# Patient Record
Sex: MALE | Race: WHITE | NOT HISPANIC OR LATINO | Employment: FULL TIME | URBAN - METROPOLITAN AREA
[De-identification: names, ages, dates, MRNs, and addresses within clinical notes are randomized per-mention and may not be internally consistent; named-entity substitution may affect disease eponyms.]

---

## 2021-01-13 ENCOUNTER — HOSPITAL ENCOUNTER (EMERGENCY)
Facility: HOSPITAL | Age: 46
Discharge: HOME | End: 2021-01-13
Attending: INTERNAL MEDICINE | Admitting: INTERNAL MEDICINE
Payer: COMMERCIAL

## 2021-01-13 VITALS
HEART RATE: 70 BPM | BODY MASS INDEX: 18.9 KG/M2 | WEIGHT: 135 LBS | RESPIRATION RATE: 16 BRPM | HEIGHT: 71 IN | SYSTOLIC BLOOD PRESSURE: 164 MMHG | OXYGEN SATURATION: 99 % | DIASTOLIC BLOOD PRESSURE: 93 MMHG | TEMPERATURE: 98 F

## 2021-01-13 DIAGNOSIS — M77.10 LATERAL EPICONDYLITIS: Primary | ICD-10-CM

## 2021-01-13 PROCEDURE — 99283 EMERGENCY DEPT VISIT LOW MDM: CPT

## 2021-01-13 PROCEDURE — 99284 EMERGENCY DEPT VISIT MOD MDM: CPT | Performed by: PHYSICIAN ASSISTANT

## 2021-01-13 RX ORDER — NAPROXEN 500 MG/1
500 TABLET ORAL 2 TIMES DAILY WITH MEALS
Qty: 30 TABLET | Refills: 0 | Status: SHIPPED | OUTPATIENT
Start: 2021-01-13

## 2021-01-13 RX ORDER — NAPROXEN 500 MG/1
500 TABLET ORAL ONCE
Status: COMPLETED | OUTPATIENT
Start: 2021-01-13 | End: 2021-01-13

## 2021-01-13 RX ADMIN — NAPROXEN 500 MG: 500 TABLET ORAL at 18:39

## 2021-01-13 NOTE — Clinical Note
Alicia Avina was seen and treated in our emergency department on 1/13/2021  Diagnosis: Elbow pain    Olamide Santiago    He may return on this date: 01/16/2021         If you have any questions or concerns, please don't hesitate to call        Mick Manjarrez PA-C    ______________________________           _______________          _______________  Hospital Representative                              Date                                Time

## 2021-01-13 NOTE — DISCHARGE INSTRUCTIONS
Take naproxen twice daily with food while your symptoms last   Following up with physical therapy may help resolve the tendinitis in your elbow  If symptoms continue, follow-up with orthopedics  Return to the ER if you experience any fevers, considerable elbow swelling or any other concerning signs or symptoms  Use elbow brace as needed

## 2021-01-13 NOTE — ED PROVIDER NOTES
History  Chief Complaint   Patient presents with    Elbow Pain       41-year-old male presents complaining of right elbow pain  Patient reports that the pain is been going on for the past 2 months  Reports that he woke up yesterday with "locked up"  Patient states that he works as a  and is rolling walls daily  Patient states that it is worse while working  Has not taken anything for his pain  Adamantly denies any elbow trauma  There is no swelling to the elbow or signs of trauma  No swelling or fevers or any history of IV drug use  None       Past Medical History:   Diagnosis Date    Emphysema lung ([address] )        History reviewed  No pertinent surgical history  History reviewed  No pertinent family history  I have reviewed and agree with the history as documented  E-Cigarette/Vaping     E-Cigarette/Vaping Substances     Social History     Tobacco Use    Smoking status: Current Every Day Smoker     Packs/day: 2 00    Smokeless tobacco: Never Used   Substance Use Topics    Alcohol use: Not Currently    Drug use: Never       Review of Systems   Constitutional: Negative for chills, fatigue and fever  HENT: Negative for congestion and sore throat  Eyes: Negative for pain  Respiratory: Negative for cough, chest tightness, shortness of breath and wheezing  Cardiovascular: Negative for chest pain, palpitations and leg swelling  Gastrointestinal: Negative for abdominal pain, constipation, diarrhea, nausea and vomiting  Endocrine: Negative for polyuria  Genitourinary: Negative for dysuria  Musculoskeletal: Positive for arthralgias  Negative for back pain, myalgias and neck pain  Skin: Negative for rash  Neurological: Negative for dizziness, syncope, light-headedness and headaches  All other systems reviewed and are negative  Physical Exam  Physical Exam  Vitals signs reviewed  Constitutional:       Appearance: He is well-developed     HENT:      Head: Normocephalic and atraumatic  Neck:      Musculoskeletal: Normal range of motion  Cardiovascular:      Rate and Rhythm: Normal rate and regular rhythm  Heart sounds: Normal heart sounds  Pulmonary:      Effort: Pulmonary effort is normal       Breath sounds: Normal breath sounds  Abdominal:      General: Bowel sounds are normal       Palpations: Abdomen is soft  Tenderness: There is no abdominal tenderness  Musculoskeletal: Normal range of motion  Comments: Diffuse tenderness in right forearm extensor musculature  Absolutely no bony tenderness  No erythema, edema, ecchymosis or other signs of trauma  No increased warmth  Full range of motion  Mild discomfort with elbow flexion extension pain with wrist extension  5/5 strength throughout all joints of upper extremities  Skin:     General: Skin is warm and dry  Capillary Refill: Capillary refill takes less than 2 seconds  Neurological:      Mental Status: He is alert and oriented to person, place, and time  Vital Signs  ED Triage Vitals [01/13/21 1829]   Temperature Pulse Respirations Blood Pressure SpO2   98 °F (36 7 °C) 70 16 164/93 99 %      Temp Source Heart Rate Source Patient Position - Orthostatic VS BP Location FiO2 (%)   Oral Monitor Lying Left arm --      Pain Score       6           Vitals:    01/13/21 1829   BP: 164/93   Pulse: 70   Patient Position - Orthostatic VS: Lying         Visual Acuity      ED Medications  Medications   naproxen (NAPROSYN) tablet 500 mg (500 mg Oral Given 1/13/21 1839)       Diagnostic Studies  Results Reviewed     None                 No orders to display              Procedures  Procedures         ED Course                                           MDM  Number of Diagnoses or Management Options  Lateral epicondylitis:   Diagnosis management comments:   Patient presented with right elbow pain x2 months  Patient had no trauma or no bony tenderness    Offered x-ray however explained that there is unlikely to be any significant findings  No increased warmth or erythema to suggest gout  Tenderness in lateral epicondyle tendons and some slight tenderness in right triceps tendon  Patient works full-time as a   This is likely an overuse injury  Patient has not yet had any anti-inflammatories  Will treat with naproxen and physical therapy and orthopedics as needed  Disposition  Final diagnoses:   Lateral epicondylitis     Time reflects when diagnosis was documented in both MDM as applicable and the Disposition within this note     Time User Action Codes Description Comment    1/13/2021  6:33 PM Gayle Gill [M77 10] Lateral epicondylitis       ED Disposition     ED Disposition Condition Date/Time Comment    Discharge Stable Wed Jan 13, 2021  6:33 PM Skippy Sosa discharge to home/self care  Follow-up Information     Follow up With Specialties Details Why Contact Info Additional Information    Physical Therapy at [facility] Physical Therapy  If symptoms worsen [address] [facility] [address] [phone] Physical Therapy at [facility], [address] [facility]   [phone] [address] [facility] Specialists [place] now[place] Orthopedic Surgery Schedule an appointment as soon as possible for a visit  As needed [facility] [address] [address]  [address] [place] [address] Specialists [place] now[place], [address], [place], [facility] [address]          Patient's Medications   Discharge Prescriptions    NAPROXEN (NAPROSYN) 500 MG TABLET    Take 1 tablet (500 mg total) by mouth 2 (two) times a day with meals       Start Date: 1/13/2021 End Date: --       Order Dose: 500 mg       Quantity: 30 tablet    Refills: 0     No discharge procedures on file      PDMP Review     None          ED Provider  Electronically Signed by           Silas Hamilton PA-C  01/13/21 2714